# Patient Record
Sex: FEMALE | Race: WHITE | NOT HISPANIC OR LATINO
[De-identification: names, ages, dates, MRNs, and addresses within clinical notes are randomized per-mention and may not be internally consistent; named-entity substitution may affect disease eponyms.]

---

## 2022-09-28 ENCOUNTER — NON-APPOINTMENT (OUTPATIENT)
Age: 42
End: 2022-09-28

## 2022-09-29 ENCOUNTER — APPOINTMENT (OUTPATIENT)
Dept: INTERNAL MEDICINE | Facility: CLINIC | Age: 42
End: 2022-09-29

## 2022-09-29 VITALS
DIASTOLIC BLOOD PRESSURE: 78 MMHG | TEMPERATURE: 97 F | OXYGEN SATURATION: 98 % | RESPIRATION RATE: 16 BRPM | SYSTOLIC BLOOD PRESSURE: 110 MMHG | HEART RATE: 81 BPM

## 2022-09-29 VITALS — BODY MASS INDEX: 24.99 KG/M2 | HEIGHT: 65 IN | WEIGHT: 150 LBS

## 2022-09-29 DIAGNOSIS — R22.31 LOCALIZED SWELLING, MASS AND LUMP, RIGHT UPPER LIMB: ICD-10-CM

## 2022-09-29 PROCEDURE — 99202 OFFICE O/P NEW SF 15 MIN: CPT

## 2022-10-05 NOTE — HISTORY OF PRESENT ILLNESS
[FreeTextEntry1] : Pt c/o right forearm lump about one week. Has mild pain. No redness, pus, trauma. No neuro sx.

## 2022-10-14 ENCOUNTER — NON-APPOINTMENT (OUTPATIENT)
Age: 42
End: 2022-10-14

## 2022-10-14 ENCOUNTER — APPOINTMENT (OUTPATIENT)
Dept: INTERNAL MEDICINE | Facility: CLINIC | Age: 42
End: 2022-10-14

## 2022-10-14 VITALS
HEIGHT: 65 IN | SYSTOLIC BLOOD PRESSURE: 100 MMHG | BODY MASS INDEX: 25.33 KG/M2 | WEIGHT: 152 LBS | DIASTOLIC BLOOD PRESSURE: 80 MMHG | HEART RATE: 87 BPM | OXYGEN SATURATION: 97 %

## 2022-10-14 DIAGNOSIS — Z00.00 ENCOUNTER FOR GENERAL ADULT MEDICAL EXAMINATION W/OUT ABNORMAL FINDINGS: ICD-10-CM

## 2022-10-14 DIAGNOSIS — Z82.49 FAMILY HISTORY OF ISCHEMIC HEART DISEASE AND OTHER DISEASES OF THE CIRCULATORY SYSTEM: ICD-10-CM

## 2022-10-14 DIAGNOSIS — Z78.9 OTHER SPECIFIED HEALTH STATUS: ICD-10-CM

## 2022-10-14 DIAGNOSIS — C43.62 MALIGNANT MELANOMA OF LEFT UPPER LIMB, INCLUDING SHOULDER: ICD-10-CM

## 2022-10-14 PROCEDURE — 36415 COLL VENOUS BLD VENIPUNCTURE: CPT

## 2022-10-14 PROCEDURE — 99396 PREV VISIT EST AGE 40-64: CPT | Mod: 25

## 2022-10-14 NOTE — PHYSICAL EXAM
[No Acute Distress] : no acute distress [No Lymphadenopathy] : no lymphadenopathy [No Edema] : there was no peripheral edema [Normal Appearance] : normal in appearance [No Masses] : no palpable masses [No Nipple Discharge] : no nipple discharge [No Axillary Lymphadenopathy] : no axillary lymphadenopathy [Normal] : soft, non-tender, non-distended, no masses palpated, no HSM and normal bowel sounds

## 2022-10-16 ENCOUNTER — TRANSCRIPTION ENCOUNTER (OUTPATIENT)
Age: 42
End: 2022-10-16

## 2022-10-17 ENCOUNTER — TRANSCRIPTION ENCOUNTER (OUTPATIENT)
Age: 42
End: 2022-10-17

## 2022-10-17 DIAGNOSIS — R20.2 PARESTHESIA OF SKIN: ICD-10-CM

## 2022-10-17 DIAGNOSIS — Z12.39 ENCOUNTER FOR OTHER SCREENING FOR MALIGNANT NEOPLASM OF BREAST: ICD-10-CM

## 2022-10-17 DIAGNOSIS — R92.2 INCONCLUSIVE MAMMOGRAM: ICD-10-CM

## 2022-10-17 DIAGNOSIS — Z78.9 OTHER SPECIFIED HEALTH STATUS: ICD-10-CM

## 2022-10-17 LAB
ALBUMIN SERPL ELPH-MCNC: 4.7 G/DL
ALP BLD-CCNC: 73 U/L
ALT SERPL-CCNC: 12 U/L
ANION GAP SERPL CALC-SCNC: 11 MMOL/L
AST SERPL-CCNC: 11 U/L
BASOPHILS # BLD AUTO: 0.03 K/UL
BASOPHILS NFR BLD AUTO: 0.5 %
BILIRUB SERPL-MCNC: 0.4 MG/DL
BUN SERPL-MCNC: 17 MG/DL
CALCIUM SERPL-MCNC: 9.7 MG/DL
CHLORIDE SERPL-SCNC: 104 MMOL/L
CHOLEST SERPL-MCNC: 184 MG/DL
CO2 SERPL-SCNC: 24 MMOL/L
CREAT SERPL-MCNC: 0.76 MG/DL
EGFR: 102 ML/MIN/1.73M2
EOSINOPHIL # BLD AUTO: 0.15 K/UL
EOSINOPHIL NFR BLD AUTO: 2.4 %
ESTIMATED AVERAGE GLUCOSE: 103 MG/DL
GLUCOSE SERPL-MCNC: 87 MG/DL
HBA1C MFR BLD HPLC: 5.2 %
HCT VFR BLD CALC: 42.5 %
HDLC SERPL-MCNC: 79 MG/DL
HGB BLD-MCNC: 13.8 G/DL
IMM GRANULOCYTES NFR BLD AUTO: 0.2 %
LDLC SERPL CALC-MCNC: 94 MG/DL
LYMPHOCYTES # BLD AUTO: 1.97 K/UL
LYMPHOCYTES NFR BLD AUTO: 31.6 %
MAN DIFF?: NORMAL
MCHC RBC-ENTMCNC: 28.5 PG
MCHC RBC-ENTMCNC: 32.5 GM/DL
MCV RBC AUTO: 87.6 FL
MONOCYTES # BLD AUTO: 0.56 K/UL
MONOCYTES NFR BLD AUTO: 9 %
NEUTROPHILS # BLD AUTO: 3.51 K/UL
NEUTROPHILS NFR BLD AUTO: 56.3 %
NONHDLC SERPL-MCNC: 104 MG/DL
PLATELET # BLD AUTO: 216 K/UL
POTASSIUM SERPL-SCNC: 4.5 MMOL/L
PROT SERPL-MCNC: 7.4 G/DL
RBC # BLD: 4.85 M/UL
RBC # FLD: 13.7 %
SODIUM SERPL-SCNC: 139 MMOL/L
T3FREE SERPL-MCNC: 2.5 PG/ML
T4 FREE SERPL-MCNC: 1.2 NG/DL
TRIGL SERPL-MCNC: 54 MG/DL
TSH SERPL-ACNC: 1.88 UIU/ML
WBC # FLD AUTO: 6.23 K/UL

## 2022-10-17 NOTE — HISTORY OF PRESENT ILLNESS
[FreeTextEntry1] : Here for PE. Last visit was here for lump on right forearm. Says it is much smaller but she will see ortho-oncologist to discuss removal. Had MRI and was told it is benign but to see ortho-onc. \par Says gets episodes of rapid regular palpitations that can last from seconds to several minutes maybe once or twice a month.  Patient says he used to be a lot more frequent before her pregnancy.  Patient does not get associated chest pain, shortness of breath, or dizziness during the palpitations.  She does occasionally feel lightheaded with or without palpitations.  She states she was informed it was because of dehydration.

## 2022-10-17 NOTE — REVIEW OF SYSTEMS
[Palpitations] : palpitations [Fever] : no fever [Night Sweats] : no night sweats [Recent Change In Weight] : ~T no recent weight change [Chest Pain] : no chest pain [Shortness Of Breath] : no shortness of breath [Wheezing] : no wheezing [Cough] : no cough [Dyspnea on Exertion] : no dyspnea on exertion [Abdominal Pain] : no abdominal pain [Nausea] : no nausea [Constipation] : no constipation [Melena] : no melena [Diarrhea] : diarrhea [Dysuria] : no dysuria [Hematuria] : no hematuria

## 2022-10-26 ENCOUNTER — TRANSCRIPTION ENCOUNTER (OUTPATIENT)
Age: 42
End: 2022-10-26

## 2022-10-26 DIAGNOSIS — R20.0 ANESTHESIA OF SKIN: ICD-10-CM

## 2022-10-27 DIAGNOSIS — Z82.49 FAMILY HISTORY OF ISCHEMIC HEART DISEASE AND OTHER DISEASES OF THE CIRCULATORY SYSTEM: ICD-10-CM

## 2022-11-02 ENCOUNTER — APPOINTMENT (OUTPATIENT)
Dept: INTERNAL MEDICINE | Facility: CLINIC | Age: 42
End: 2022-11-02

## 2022-11-02 PROCEDURE — 93242 EXT ECG>48HR<7D RECORDING: CPT

## 2022-11-11 PROBLEM — R42 DIZZINESS: Status: RESOLVED | Noted: 2022-10-27 | Resolved: 2022-11-26

## 2022-11-15 ENCOUNTER — APPOINTMENT (OUTPATIENT)
Dept: CARDIOLOGY | Facility: CLINIC | Age: 42
End: 2022-11-15

## 2022-11-15 VITALS
HEART RATE: 82 BPM | BODY MASS INDEX: 25.33 KG/M2 | OXYGEN SATURATION: 100 % | DIASTOLIC BLOOD PRESSURE: 70 MMHG | WEIGHT: 152 LBS | TEMPERATURE: 97.6 F | HEIGHT: 65 IN | SYSTOLIC BLOOD PRESSURE: 100 MMHG

## 2022-11-15 DIAGNOSIS — R00.2 PALPITATIONS: ICD-10-CM

## 2022-11-15 DIAGNOSIS — R42 DIZZINESS AND GIDDINESS: ICD-10-CM

## 2022-11-15 DIAGNOSIS — R20.0 ANESTHESIA OF SKIN: ICD-10-CM

## 2022-11-15 PROCEDURE — 99204 OFFICE O/P NEW MOD 45 MIN: CPT

## 2022-11-23 ENCOUNTER — APPOINTMENT (OUTPATIENT)
Dept: INTERNAL MEDICINE | Facility: CLINIC | Age: 42
End: 2022-11-23

## 2022-11-23 VITALS
RESPIRATION RATE: 16 BRPM | WEIGHT: 152 LBS | TEMPERATURE: 98 F | HEIGHT: 65 IN | SYSTOLIC BLOOD PRESSURE: 110 MMHG | DIASTOLIC BLOOD PRESSURE: 70 MMHG | BODY MASS INDEX: 25.33 KG/M2 | OXYGEN SATURATION: 97 % | HEART RATE: 96 BPM

## 2022-11-23 DIAGNOSIS — L98.9 DISORDER OF THE SKIN AND SUBCUTANEOUS TISSUE, UNSPECIFIED: ICD-10-CM

## 2022-11-23 DIAGNOSIS — R47.81 SLURRED SPEECH: ICD-10-CM

## 2022-11-23 PROCEDURE — 99212 OFFICE O/P EST SF 10 MIN: CPT

## 2022-11-23 NOTE — HISTORY OF PRESENT ILLNESS
[FreeTextEntry8] : Patient states yesterday she noted a rash on the back of her left arm near the shoulder.  No pain, itch, pus.  Does not recall any trauma to the area.  No similar rash anywhere else

## 2022-11-23 NOTE — PHYSICAL EXAM
[No Acute Distress] : no acute distress [Normal Voice/Communication] : normal voice/communication [de-identified] : Approximately 5 mm skin ulceration posterior left arm with no erythema, exudate, scaling.  Early granulation tissue.

## 2024-06-21 ENCOUNTER — NON-APPOINTMENT (OUTPATIENT)
Age: 44
End: 2024-06-21

## 2024-06-22 ENCOUNTER — LABORATORY RESULT (OUTPATIENT)
Age: 44
End: 2024-06-22

## 2024-06-22 ENCOUNTER — NON-APPOINTMENT (OUTPATIENT)
Age: 44
End: 2024-06-22

## 2024-06-22 ENCOUNTER — APPOINTMENT (OUTPATIENT)
Dept: INTERNAL MEDICINE | Facility: CLINIC | Age: 44
End: 2024-06-22
Payer: COMMERCIAL

## 2024-06-22 VITALS — HEART RATE: 94 BPM | OXYGEN SATURATION: 98 % | BODY MASS INDEX: 23.99 KG/M2 | WEIGHT: 144 LBS | HEIGHT: 65 IN

## 2024-06-22 VITALS — DIASTOLIC BLOOD PRESSURE: 78 MMHG | SYSTOLIC BLOOD PRESSURE: 118 MMHG

## 2024-06-22 DIAGNOSIS — Z83.49 FAMILY HISTORY OF OTHER ENDOCRINE, NUTRITIONAL AND METABOLIC DISEASES: ICD-10-CM

## 2024-06-22 DIAGNOSIS — R53.83 OTHER FATIGUE: ICD-10-CM

## 2024-06-22 DIAGNOSIS — G43.109 MIGRAINE WITH AURA, NOT INTRACTABLE, W/OUT STATUS MIGRAINOSUS: ICD-10-CM

## 2024-06-22 DIAGNOSIS — M79.10 MYALGIA, UNSPECIFIED SITE: ICD-10-CM

## 2024-06-22 DIAGNOSIS — Z00.00 ENCOUNTER FOR GENERAL ADULT MEDICAL EXAMINATION W/OUT ABNORMAL FINDINGS: ICD-10-CM

## 2024-06-22 DIAGNOSIS — E03.8 OTHER SPECIFIED HYPOTHYROIDISM: ICD-10-CM

## 2024-06-22 DIAGNOSIS — E06.3 OTHER SPECIFIED HYPOTHYROIDISM: ICD-10-CM

## 2024-06-22 DIAGNOSIS — Z82.0 FAMILY HISTORY OF EPILEPSY AND OTHER DISEASES OF THE NERVOUS SYSTEM: ICD-10-CM

## 2024-06-22 PROCEDURE — 99396 PREV VISIT EST AGE 40-64: CPT | Mod: 25

## 2024-06-22 PROCEDURE — G2211 COMPLEX E/M VISIT ADD ON: CPT | Mod: NC

## 2024-06-22 PROCEDURE — 93000 ELECTROCARDIOGRAM COMPLETE: CPT

## 2024-06-22 PROCEDURE — 36415 COLL VENOUS BLD VENIPUNCTURE: CPT

## 2024-06-22 RX ORDER — RIZATRIPTAN BENZOATE 5 MG/1
5 TABLET ORAL
Qty: 90 | Refills: 0 | Status: ACTIVE | COMMUNITY
Start: 2024-06-22 | End: 1900-01-01

## 2024-06-22 NOTE — ASSESSMENT
[FreeTextEntry1] : check cbc with diff, cmp, tsh w/ fT4, lipid, a1c, UA  check tick panel, EMA, RF, anti-CCP, CK, aldolase no need for doxy, no rash EKG obtained to assist in diagnosis and management of assessed problem(s). NSR, rate 77, no ST changes but PRWP and diffuse low voltage- unchanged from previous EKG's. No chest pain, shortness of breath, dizziness, palpitations.  Renewed Rizatriptan. Has enough Synthroid. Needs to send records from endocrine (Dr. Swift) and GYN (Dr. Ibanez).

## 2024-06-22 NOTE — HEALTH RISK ASSESSMENT
[YES] : Yes [Are there any children in your household?] : There are children in the household. [Have you attended a firearm safety workshop or class?] : A firearm safety workshop or class has been attended. [Yes] : Yes [2 - 4 times a month (2 pts)] : 2-4 times a month (2 points) [1 or 2 (0 pts)] : 1 or 2 (0 points) [Never (0 pts)] : Never (0 points) [No] : In the past 12 months have you used drugs other than those required for medical reasons? No [Little interest or pleasure doing things] : 1) Little interest or pleasure doing things [Feeling down, depressed, or hopeless] : 2) Feeling down, depressed, or hopeless [0] : 2) Feeling down, depressed, or hopeless: Not at all (0) [PHQ-2 Negative - No further assessment needed] : PHQ-2 Negative - No further assessment needed [Audit-CScore] : 2 [de-identified] : walking [de-identified] : fair [[id number]] : 0 [Patient reported mammogram was abnormal] : Patient reported mammogram was abnormal [Patient reported PAP Smear was normal] : Patient reported PAP Smear was normal [HIV test declined] : HIV test declined [Hepatitis C test declined] : Hepatitis C test declined [With Family] : lives with family [Employed] : employed [Graduate School] : graduate school [] :  [# Of Children ___] : has [unfilled] children [Sexually Active] : sexually active [High Risk Behavior] : no high risk behavior [Feels Safe at Home] : Feels safe at home [Fully functional (bathing, dressing, toileting, transferring, walking, feeding)] : Fully functional (bathing, dressing, toileting, transferring, walking, feeding) [Fully functional (using the telephone, shopping, preparing meals, housekeeping, doing laundry, using] : Fully functional and needs no help or supervision to perform IADLs (using the telephone, shopping, preparing meals, housekeeping, doing laundry, using transportation, managing medications and managing finances) [Reports changes in hearing] : Reports no changes in hearing [Reports changes in vision] : Reports no changes in vision [Reports normal functional visual acuity (ie: able to read med bottle)] : Reports normal functional visual acuity [Reports changes in dental health] : Reports no changes in dental health [MammogramComments] : need records  [MammogramDate] : 08/23 [PapSmearDate] : 01/24 [PapSmearComments] : need records  [Never] : Never [Are there any unlocked firearms stored in your household?] : No unlocked firearms in the household. [Are there any firearms stored in your household that are loaded?] : No firearms are stored in the household loaded. [Has anyone in the household been feeling low/depressed/been struggling?] : No one in the household has been feeling low/depressed/been struggling.

## 2024-06-22 NOTE — REVIEW OF SYSTEMS
[Fatigue] : fatigue [Joint Stiffness] : joint stiffness [Muscle Weakness] : muscle weakness [Muscle Pain] : muscle pain [Negative] : Heme/Lymph

## 2024-06-22 NOTE — PHYSICAL EXAM
[No Acute Distress] : no acute distress [Well Nourished] : well nourished [Well Developed] : well developed [Well-Appearing] : well-appearing [Normal Sclera/Conjunctiva] : normal sclera/conjunctiva [PERRL] : pupils equal round and reactive to light [EOMI] : extraocular movements intact [Normal Outer Ear/Nose] : the outer ears and nose were normal in appearance [Normal Oropharynx] : the oropharynx was normal [No JVD] : no jugular venous distention [No Lymphadenopathy] : no lymphadenopathy [Thyroid Normal, No Nodules] : the thyroid was normal and there were no nodules present [Supple] : supple [No Accessory Muscle Use] : no accessory muscle use [No Respiratory Distress] : no respiratory distress  [Clear to Auscultation] : lungs were clear to auscultation bilaterally [Normal Rate] : normal rate  [Regular Rhythm] : with a regular rhythm [Normal S1, S2] : normal S1 and S2 [No Murmur] : no murmur heard [No Carotid Bruits] : no carotid bruits [No Abdominal Bruit] : a ~M bruit was not heard ~T in the abdomen [No Varicosities] : no varicosities [Pedal Pulses Present] : the pedal pulses are present [No Edema] : there was no peripheral edema [No Palpable Aorta] : no palpable aorta [No Extremity Clubbing/Cyanosis] : no extremity clubbing/cyanosis [Soft] : abdomen soft [Non Tender] : non-tender [Non-distended] : non-distended [No Masses] : no abdominal mass palpated [No HSM] : no HSM [Normal Bowel Sounds] : normal bowel sounds [Normal Posterior Cervical Nodes] : no posterior cervical lymphadenopathy [Normal Anterior Cervical Nodes] : no anterior cervical lymphadenopathy [No CVA Tenderness] : no CVA  tenderness [No Spinal Tenderness] : no spinal tenderness [No Joint Swelling] : no joint swelling [Grossly Normal Strength/Tone] : grossly normal strength/tone [No Rash] : no rash [Coordination Grossly Intact] : coordination grossly intact [No Focal Deficits] : no focal deficits [Normal Gait] : normal gait [Deep Tendon Reflexes (DTR)] : deep tendon reflexes were 2+ and symmetric [Normal Affect] : the affect was normal [Normal Insight/Judgement] : insight and judgment were intact [de-identified] : bicep, tricep, b/l calf pain when palpated, negative Basil's sign, no palpable cord

## 2024-06-24 LAB
ALBUMIN SERPL ELPH-MCNC: 4.4 G/DL
ALP BLD-CCNC: 39 U/L
ALT SERPL-CCNC: 13 U/L
ANION GAP SERPL CALC-SCNC: 10 MMOL/L
AST SERPL-CCNC: 19 U/L
BILIRUB SERPL-MCNC: 0.4 MG/DL
BUN SERPL-MCNC: 14 MG/DL
CALCIUM SERPL-MCNC: 8.9 MG/DL
CHLORIDE SERPL-SCNC: 108 MMOL/L
CHOLEST SERPL-MCNC: 139 MG/DL
CK SERPL-CCNC: 269 U/L
CO2 SERPL-SCNC: 21 MMOL/L
CREAT SERPL-MCNC: 0.67 MG/DL
EGFR: 112 ML/MIN/1.73M2
ESTIMATED AVERAGE GLUCOSE: 103 MG/DL
GLUCOSE SERPL-MCNC: 89 MG/DL
HBA1C MFR BLD HPLC: 5.2 %
HDLC SERPL-MCNC: 67 MG/DL
LDLC SERPL CALC-MCNC: 62 MG/DL
NONHDLC SERPL-MCNC: 72 MG/DL
POTASSIUM SERPL-SCNC: 4.3 MMOL/L
PROT SERPL-MCNC: 6.9 G/DL
RHEUMATOID FACT SER QL: <10 IU/ML
SODIUM SERPL-SCNC: 140 MMOL/L
T4 FREE SERPL-MCNC: 1.2 NG/DL
TRIGL SERPL-MCNC: 40 MG/DL
TSH SERPL-ACNC: 2.73 UIU/ML

## 2024-06-24 RX ORDER — LEVOTHYROXINE SODIUM 50 UG/1
50 CAPSULE ORAL
Refills: 0 | Status: DISCONTINUED | COMMUNITY
Start: 2022-10-14 | End: 2024-06-24

## 2024-06-24 RX ORDER — LEVOTHYROXINE SODIUM 0.05 MG/1
50 TABLET ORAL
Qty: 90 | Refills: 0 | Status: ACTIVE | COMMUNITY
Start: 2024-03-20

## 2024-06-26 LAB
ALDOLASE SERPL-CCNC: 3.3 U/L
BASOPHILS # BLD AUTO: 0.02 K/UL
BASOPHILS NFR BLD AUTO: 0.5 %
CCP AB SER IA-ACNC: <8 UNITS
EOSINOPHIL # BLD AUTO: 0.03 K/UL
EOSINOPHIL NFR BLD AUTO: 0.8 %
ERYTHROCYTE [SEDIMENTATION RATE] IN BLOOD BY WESTERGREN METHOD: 2 MM/HR
HCT VFR BLD CALC: 49.5 %
HGB BLD-MCNC: 13.7 G/DL
IMM GRANULOCYTES NFR BLD AUTO: 0 %
LYMPHOCYTES # BLD AUTO: 1.45 K/UL
LYMPHOCYTES NFR BLD AUTO: 37.5 %
MAN DIFF?: NORMAL
MCHC RBC-ENTMCNC: 27.7 GM/DL
MCHC RBC-ENTMCNC: 27.9 PG
MCV RBC AUTO: 100.8 FL
MONOCYTES # BLD AUTO: 0.24 K/UL
MONOCYTES NFR BLD AUTO: 6.2 %
NEUTROPHILS # BLD AUTO: 2.13 K/UL
NEUTROPHILS NFR BLD AUTO: 55 %
PLATELET # BLD AUTO: 174 K/UL
RBC # BLD: 4.91 M/UL
RBC # FLD: 15.1 %
RF+CCP IGG SER-IMP: NEGATIVE
WBC # FLD AUTO: 3.87 K/UL

## 2024-06-29 DIAGNOSIS — R76.8 OTHER SPECIFIED ABNORMAL IMMUNOLOGICAL FINDINGS IN SERUM: ICD-10-CM

## 2024-06-29 LAB
A PHAGOCYTOPH IGG TITR SER IF: NORMAL
ANA PAT FLD IF-IMP: NORMAL
ANA SER IF-ACNC: ABNORMAL
B BURGDOR AB SER QL IA: 0.24 IV
B MICROTI IGG TITR SER: NORMAL
E CHAFFEENSIS IGG TITR SER IF: NORMAL

## 2024-07-03 ENCOUNTER — APPOINTMENT (OUTPATIENT)
Dept: INTERNAL MEDICINE | Facility: CLINIC | Age: 44
End: 2024-07-03
Payer: COMMERCIAL

## 2024-07-03 VITALS — WEIGHT: 144 LBS | BODY MASS INDEX: 23.99 KG/M2 | OXYGEN SATURATION: 98 % | HEIGHT: 65 IN | HEART RATE: 79 BPM

## 2024-07-03 VITALS — DIASTOLIC BLOOD PRESSURE: 68 MMHG | SYSTOLIC BLOOD PRESSURE: 110 MMHG

## 2024-07-03 DIAGNOSIS — R76.8 OTHER SPECIFIED ABNORMAL IMMUNOLOGICAL FINDINGS IN SERUM: ICD-10-CM

## 2024-07-03 DIAGNOSIS — R74.8 ABNORMAL LEVELS OF OTHER SERUM ENZYMES: ICD-10-CM

## 2024-07-03 DIAGNOSIS — E03.8 OTHER SPECIFIED HYPOTHYROIDISM: ICD-10-CM

## 2024-07-03 DIAGNOSIS — E06.3 OTHER SPECIFIED HYPOTHYROIDISM: ICD-10-CM

## 2024-07-03 DIAGNOSIS — M79.10 MYALGIA, UNSPECIFIED SITE: ICD-10-CM

## 2024-07-03 DIAGNOSIS — R53.83 OTHER FATIGUE: ICD-10-CM

## 2024-07-03 DIAGNOSIS — G43.109 MIGRAINE WITH AURA, NOT INTRACTABLE, W/OUT STATUS MIGRAINOSUS: ICD-10-CM

## 2024-07-03 PROCEDURE — G2211 COMPLEX E/M VISIT ADD ON: CPT | Mod: NC

## 2024-07-03 PROCEDURE — 36415 COLL VENOUS BLD VENIPUNCTURE: CPT

## 2024-07-03 PROCEDURE — 99213 OFFICE O/P EST LOW 20 MIN: CPT

## 2024-07-06 LAB
ALBUMIN SERPL ELPH-MCNC: 4.4 G/DL
ALP BLD-CCNC: 43 U/L
ALT SERPL-CCNC: 10 U/L
ANION GAP SERPL CALC-SCNC: 16 MMOL/L
APPEARANCE: CLEAR
AST SERPL-CCNC: 12 U/L
BACTERIA: NEGATIVE /HPF
BASOPHILS # BLD AUTO: 0.02 K/UL
BASOPHILS NFR BLD AUTO: 0.4 %
BILIRUB SERPL-MCNC: 0.6 MG/DL
BILIRUBIN URINE: NEGATIVE
BLOOD URINE: NEGATIVE
BUN SERPL-MCNC: 10 MG/DL
CALCIUM SERPL-MCNC: 9.1 MG/DL
CAST: 0 /LPF
CHLORIDE SERPL-SCNC: 102 MMOL/L
CK SERPL-CCNC: 60 U/L
CO2 SERPL-SCNC: 18 MMOL/L
COLOR: YELLOW
CREAT SERPL-MCNC: 0.71 MG/DL
EGFR: 109 ML/MIN/1.73M2
EOSINOPHIL # BLD AUTO: 0.07 K/UL
EOSINOPHIL NFR BLD AUTO: 1.4 %
EPITHELIAL CELLS: 2 /HPF
GLUCOSE QUALITATIVE U: NEGATIVE MG/DL
GLUCOSE SERPL-MCNC: 91 MG/DL
HCT VFR BLD CALC: 42.7 %
HGB BLD-MCNC: 13.7 G/DL
IMM GRANULOCYTES NFR BLD AUTO: 0.2 %
KETONES URINE: NEGATIVE MG/DL
LDH SERPL-CCNC: 174 U/L
LEUKOCYTE ESTERASE URINE: NEGATIVE
LYMPHOCYTES # BLD AUTO: 1.57 K/UL
LYMPHOCYTES NFR BLD AUTO: 31.4 %
MAN DIFF?: NORMAL
MCHC RBC-ENTMCNC: 28.8 PG
MCHC RBC-ENTMCNC: 32.1 GM/DL
MCV RBC AUTO: 89.9 FL
MICROSCOPIC-UA: NORMAL
MONOCYTES # BLD AUTO: 0.43 K/UL
MONOCYTES NFR BLD AUTO: 8.6 %
NEUTROPHILS # BLD AUTO: 2.9 K/UL
NEUTROPHILS NFR BLD AUTO: 58 %
NITRITE URINE: NEGATIVE
PH URINE: 6.5
PLATELET # BLD AUTO: 215 K/UL
POTASSIUM SERPL-SCNC: 4.3 MMOL/L
PROT SERPL-MCNC: 7 G/DL
PROTEIN URINE: NEGATIVE MG/DL
RBC # BLD: 4.75 M/UL
RBC # FLD: 14 %
RED BLOOD CELLS URINE: 1 /HPF
SODIUM SERPL-SCNC: 137 MMOL/L
SPECIFIC GRAVITY URINE: 1.01
UROBILINOGEN URINE: 0.2 MG/DL
WBC # FLD AUTO: 5 K/UL
WHITE BLOOD CELLS URINE: 0 /HPF

## 2024-08-27 ENCOUNTER — APPOINTMENT (OUTPATIENT)
Dept: RHEUMATOLOGY | Facility: CLINIC | Age: 44
End: 2024-08-27
Payer: COMMERCIAL

## 2024-08-27 VITALS
WEIGHT: 145 LBS | DIASTOLIC BLOOD PRESSURE: 74 MMHG | BODY MASS INDEX: 24.16 KG/M2 | HEART RATE: 78 BPM | HEIGHT: 65 IN | SYSTOLIC BLOOD PRESSURE: 112 MMHG | OXYGEN SATURATION: 99 % | TEMPERATURE: 97 F

## 2024-08-27 DIAGNOSIS — R53.83 OTHER FATIGUE: ICD-10-CM

## 2024-08-27 DIAGNOSIS — R74.8 ABNORMAL LEVELS OF OTHER SERUM ENZYMES: ICD-10-CM

## 2024-08-27 DIAGNOSIS — R23.3 SPONTANEOUS ECCHYMOSES: ICD-10-CM

## 2024-08-27 DIAGNOSIS — E06.3 AUTOIMMUNE THYROIDITIS: ICD-10-CM

## 2024-08-27 DIAGNOSIS — G43.109 MIGRAINE WITH AURA, NOT INTRACTABLE, W/OUT STATUS MIGRAINOSUS: ICD-10-CM

## 2024-08-27 DIAGNOSIS — F45.8 OTHER SOMATOFORM DISORDERS: ICD-10-CM

## 2024-08-27 DIAGNOSIS — R59.1 GENERALIZED ENLARGED LYMPH NODES: ICD-10-CM

## 2024-08-27 DIAGNOSIS — Z82.61 FAMILY HISTORY OF ARTHRITIS: ICD-10-CM

## 2024-08-27 DIAGNOSIS — M79.10 MYALGIA, UNSPECIFIED SITE: ICD-10-CM

## 2024-08-27 DIAGNOSIS — Z83.49 FAMILY HISTORY OF OTHER ENDOCRINE, NUTRITIONAL AND METABOLIC DISEASES: ICD-10-CM

## 2024-08-27 DIAGNOSIS — R76.8 OTHER SPECIFIED ABNORMAL IMMUNOLOGICAL FINDINGS IN SERUM: ICD-10-CM

## 2024-08-27 DIAGNOSIS — G47.63 SLEEP RELATED BRUXISM: ICD-10-CM

## 2024-08-27 PROCEDURE — 99203 OFFICE O/P NEW LOW 30 MIN: CPT

## 2024-09-02 PROBLEM — F45.8 BRUXISM (TEETH GRINDING): Status: ACTIVE | Noted: 2024-09-02

## 2024-09-02 PROBLEM — G47.63 BRUXISM, SLEEP-RELATED: Status: ACTIVE | Noted: 2024-09-02

## 2024-09-02 PROBLEM — E06.3 HASHIMOTO'S DISEASE: Status: ACTIVE | Noted: 2024-09-02

## 2024-09-02 PROBLEM — R59.1 LYMPHADENOPATHY: Status: ACTIVE | Noted: 2024-09-02

## 2024-09-02 PROBLEM — E06.3 HYPOTHYROIDISM DUE TO HASHIMOTO'S THYROIDITIS: Status: ACTIVE | Noted: 2022-09-29

## 2024-09-02 RX ORDER — ACETAMINOPHEN 325 MG
TABLET ORAL
Refills: 0 | Status: ACTIVE | COMMUNITY

## 2024-09-02 NOTE — HISTORY OF PRESENT ILLNESS
[FreeTextEntry1] : [birth year] - 45yo female with chief complaint of positive EMA and fatigue  Initial HPI with c/o fatigue and mild diffuse arthralgias w/ + EMA [id number] and mildly elevated -> repeat nl   Past muscle aches and pain, resolved Fatigue is primary current issue, symptoms are new and persistent past few months  Patient works full-time as a special-ed  Used to work out at the gym, but hasn't much since having son.. 2 ys ago..   Hx two TIAs postpartum two years ago with no residual sx and etiology unclear.. full eval at time negative for AI process.   Presented with left side numb and droopy - weakness for several minutes.  Week or two later had same symptoms again, complains on intense headache afterward Visited neurologists, attributed to complex migraine Hx chronic migraines - was on Aspirin during pregnancy to treat migraines with hx of experiencing scotomas in March  Was not tested for antiphospholipid syndrome s/p IVF, but no miscarriages Past infertility caused by unexplained reasons  No DVT, no heart attack or stroke, no phlebitis Occasional tingling in hand and elbow on right side since but nothing persis Has herniated disk (5 & 6)  No cytopenias Has Hashimoto's (normal thyroid function) and RP- mild x several ys, no medications needed (doesn't take anything for it) No ulcers that don't heal Complains of orthostatic hypertension, frequent lightheadedness but no syncope  In June, fatigue pains and tiredness occurred around limbs (at this time CPK was 277)- can't remember acute infection / stress/ injury preceding onset   Sleep: NRS  Fatigue several months now..  waking up several times a night, can be up for an hour before returning to sleep, attributes to anxiety- can't stop mind  No past anxiety diagnosis, thinks she might have it Does not wake up feeling restored No issues with snoring,  +night bruxism, occasional hand numbness at night but this is not what wakes her Occasional night sweats (not hot flashes)- but nothing routinely.  Latency minimal..   Posterior cervical lymph node enlarged, still enlarged  Biopsied at least a year ago, negative  No other swollen lymph nodes  Occasionally feels some anxiety-attributed chest tightness/shortness of breath Some numbness/tingling in right arm Bilateral hand numbness at night  Some joint pain but no inflammation  Specifically denies mucositis, visual disturbance/ keratoconjunctivitis/ oral / vaginal dryness, serositis (no cardiopulmonary, HSM), no renal dysfunction, rash, alopecia, cytopenias, bleeding.  As noted TIAs x 2 = and occas paresthesia's BUE but not associated w/ weakness

## 2024-09-02 NOTE — PHYSICAL EXAM
[General Appearance - Alert] : alert [General Appearance - In No Acute Distress] : in no acute distress [Sclera] : the sclera and conjunctiva were normal [PERRL With Normal Accommodation] : pupils were equal in size, round, and reactive to light [Extraocular Movements] : extraocular movements were intact [Outer Ear] : the ears and nose were normal in appearance [Examination Of The Oral Cavity] : the lips and gums were normal [Nasal Cavity] : the nasal mucosa and septum were normal [Oropharynx] : the oropharynx was normal [Neck Appearance] : the appearance of the neck was normal [Jugular Venous Distention Increased] : there was no jugular-venous distention [Thyroid Diffuse Enlargement] : the thyroid was not enlarged [Thyroid Nodule] : there were no palpable thyroid nodules [Respiration, Rhythm And Depth] : normal respiratory rhythm and effort [Auscultation Breath Sounds / Voice Sounds] : lungs were clear to auscultation bilaterally [Heart Rate And Rhythm] : heart rate was normal and rhythm regular [Heart Sounds] : normal S1 and S2 [Heart Sounds Gallop] : no gallops [Murmurs] : no murmurs [Heart Sounds Pericardial Friction Rub] : no pericardial rub [Full Pulse] : the pedal pulses are present [Edema] : there was no peripheral edema [Bowel Sounds] : normal bowel sounds [Abdomen Soft] : soft [Abdomen Tenderness] : non-tender [Abdomen Mass (___ Cm)] : no abdominal mass palpated [Cervical Lymph Nodes Enlarged Posterior Bilaterally] : posterior cervical [Supraclavicular Lymph Nodes Enlarged Bilaterally] : supraclavicular [Axillary Lymph Nodes Enlarged Bilaterally] : axillary [Femoral Lymph Nodes Enlarged Bilaterally] : femoral [Inguinal Lymph Nodes Enlarged Bilaterally] : inguinal [No CVA Tenderness] : no ~M costovertebral angle tenderness [No Spinal Tenderness] : no spinal tenderness [Abnormal Walk] : normal gait [Nail Clubbing] : no clubbing  or cyanosis of the fingernails [Musculoskeletal - Swelling] : no joint swelling seen [Motor Tone] : muscle strength and tone were normal [Skin Color & Pigmentation] : normal skin color and pigmentation [] : no rash [Skin Turgor] : normal skin turgor [Sensation] : the sensory exam was normal to light touch and pinprick [No Focal Deficits] : no focal deficits [Oriented To Time, Place, And Person] : oriented to person, place, and time [Affect] : the affect was normal [Impaired Insight] : insight and judgment were intact [FreeTextEntry1] : calm, pleasant, describes anxiety

## 2024-09-02 NOTE — ASSESSMENT
[FreeTextEntry1] : 43yo w female with PMH AITD, mild orthostatic hypotension, RUE mild intermittent paresthesias, RP, and presents with chief complaint of positive EMA and fatigue worse since 6/24- but actually improved since then  1) + EMA/ AITD:  1:160 w/ DFSO pattern- not routinely associated w/ CTD.  Dx w/ Hashimotos ys ago on thyroid replacment with nl TFTs.  Longstanding hx mild RP - without complications and no medication needed  AITD Certainly could explain + EMA. Little in hx or exam to suggest CTD at this time.  NOTE:  - Hx TIA:  2022, x 2 episodes w/u negative at time for APS- no hx MI/ DVT/ PE or cytopenias  - Infertility:  etiology unclear, required aSA for successful pregnancy   2) Fatigue r/t Insomnia and probable SAVI: Describes NRS with  mild persistent with hx of anxiety by description interfering with sleep and known bruxism- not using mouth guard- needs updated device.   Wakes in middle of night and can't go back to sleep... can't stop "mind".   Denies snoring, no trouble falling asleep most nights, occas night sweats but nothing routine  - Migraines:  long standing.. with possible complex hx TIA x 2 w/ APS studies in past neg and opth manifestations- scotomas in past   - Orthostatic Hypotension:  mild intermittent. not associated with tachycardia, no syncope/  injuries- c/w volume/ electrolyte replacement..  - Anxiety:  may need pharmacotherapy.. if persists - Exercise: needs to resume and ideally 30 min outdoors daily- known to improve quality of sleep  3) Cervical radiculopathy:  Mild intermittent R hand/ elbow paresthesias- tingling / numbness..  MR + HNP C 5-6 (reportedly- would need to review)  4) Posterior cervical lymph node enlarged, still enlarged R side..  Biopsied at least a year ago, negative  No other swollen lymph nodes  Discussion:  Overall, healthy young f with little to suggest a systemic process.  There are several issues that could be addressed to manage fatigue- improve function..  - manage fatigue as needed through  1.  Managing anxiety 2.  Should address bruxism, needs to wear mouth guard, degree of pressure can be severe and result in NRS 3.  Exercise ideally and if needed may consider LD SSRI 4.  Manage stress:  consider therapy as needed if unable to manage on own... discussed use of online tools especially when waking in middle of night: Calm ruy/ Insight Timer.. can be used to promote return to sleep 5.  Mnt healthy sleep hygiene:  room dark, cool, no pets/ children in bed.  Bed is for sleep/ sex- nothing more - monitor for SS SLE/ APS but little on exam today and reassurance provided of such - monitor LN and address as needed if increase in lymphadenopathy.     Plan 8/27/24: - Manage anxiety, see therapist (probably no meds unless worsens) see above  - Try  to improve sleep, get updated oral appliance and be c/w use...though addressing anxiety may help bruxism  - given TIA  and Lymphadenopathy .. in setting of AITD hx will do full AVISE but again reassurance provided that little to suggest.    - RPA as needed, should be able to manage in Primary care, return if AI sx evolve.

## 2024-09-02 NOTE — CONSULT LETTER
[Dear  ___] : Dear  [unfilled], [Consult Letter:] : I had the pleasure of evaluating your patient, [unfilled]. [Consult Closing:] : Thank you very much for allowing me to participate in the care of this patient.  If you have any questions, please do not hesitate to contact me. [Sincerely,] : Sincerely, [FreeTextEntry2] : Delmer Rose MD  [FreeTextEntry1] : Please see below for summary of  recent rheumatology evaluation and recommendations.  45yo w female with PMH AITD, mild orthostatic hypotension, RUE mild intermittent paresthesias, RP, and presents with chief complaint of positive EMA and fatigue worse since 6/24- but actually improved since then  1) + EMA/ AITD:  1:160 w/ DFSO pattern- not routinely associated w/ CTD.  Dx w/ Hashimotos ys ago on thyroid replacment with nl TFTs.  Longstanding hx mild RP - without complications and no medication needed  AITD Certainly could explain + EMA. Little in hx or exam to suggest CTD at this time.  NOTE:  - Hx TIA:  2022, x 2 episodes w/u negative at time for APS- no hx MI/ DVT/ PE or cytopenias  - Infertility:  etiology unclear, required aSA for successful pregnancy   2) Fatigue r/t Insomnia and probable SAVI: Describes NRS with  mild persistent with hx of anxiety by description interfering with sleep and known bruxism- not using mouth guard- needs updated device.   Wakes in middle of night and can't go back to sleep... can't stop "mind".   Denies snoring, no trouble falling asleep most nights, occas night sweats but nothing routine  - Migraines:  long standing.. with possible complex hx TIA x 2 w/ APS studies in past neg and opth manifestations- scotomas in past   - Orthostatic Hypotension:  mild intermittent. not associated with tachycardia, no syncope/  injuries- c/w volume/ electrolyte replacement..  - Anxiety:  may need pharmacotherapy.. if persists - Exercise: needs to resume and ideally 30 min outdoors daily- known to improve quality of sleep  3) Cervical radiculopathy:  Mild intermittent R hand/ elbow paresthesias- tingling / numbness..  MR + HNP C 5-6 (reportedly- would need to review)  4) Posterior cervical lymph node enlarged, still enlarged R side..  Biopsied at least a year ago, negative  No other swollen lymph nodes  Discussion:  Overall, healthy young f with little to suggest a systemic process.  There are several issues that could be addressed to manage fatigue- improve function..  - manage fatigue as needed through  1.  Managing anxiety 2.  Should address bruxism, needs to wear mouth guard, degree of pressure can be severe and result in NRS 3.  Exercise ideally and if needed may consider LD SSRI 4.  Manage stress:  consider therapy as needed if unable to manage on own... discussed use of online tools especially when waking in middle of night: Calm ruy/ Insight Timer.. can be used to promote return to sleep 5.  Mnt healthy sleep hygiene:  room dark, cool, no pets/ children in bed.  Bed is for sleep/ sex- nothing more - monitor for SS SLE/ APS but little on exam today and reassurance provided of such - monitor LN and address as needed if increase in lymphadenopathy.     Plan 8/27/24: - Manage anxiety, see therapist (probably no meds unless worsens) see above  - Try  to improve sleep, get updated oral appliance and be c/w use...though addressing anxiety may help bruxism  - given TIA  and Lymphadenopathy .. in setting of AITD hx will do full AVISE but again reassurance provided that little to suggest.    - RPA as needed, should be able to manage in Primary care, return if AI sx evolve.   [FreeTextEntry3] : Ramonita Walker DNP, [facility] Rheumatology [facility]

## 2024-09-02 NOTE — REVIEW OF SYSTEMS
[Feeling Tired] : feeling tired [Eyesight Problems] : eyesight problems [Shortness Of Breath] : shortness of breath [Joint Pain] : joint pain [Limb Pain] : limb pain [Dizziness] : dizziness [Feelings Of Weakness] : feelings of weakness [Anxiety] : anxiety [Easy Bruising] : a tendency for easy bruising [Swollen Glands In The Neck] : swollen glands in the neck [Palpitations] : palpitations [Sleep Disturbances] : sleep disturbances [As Noted in HPI] : as noted in HPI [Negative] : Heme/Lymph [FreeTextEntry2] : Interrupted sleep [FreeTextEntry3] : Scotomas in March, resolved now [FreeTextEntry5] : anxiety r/t  [FreeTextEntry6] : Occasional anxiety-induced shortness of breath [FreeTextEntry8] : G 1 P1  [FreeTextEntry9] : Some pain and numbness and tingling in right arm- mild joint pain symmetrically but no joint stiffness/ swellling  [de-identified] : Orthostatic hypertension- not new- better with electrolyte replacement  [de-identified] : Clear anxiety symptoms [de-identified] : Fatigue symptoms [de-identified] : Swollen back cervical lymph node, mentions easy bruising

## 2024-09-02 NOTE — REVIEW OF SYSTEMS
[Feeling Tired] : feeling tired [Eyesight Problems] : eyesight problems [Shortness Of Breath] : shortness of breath [Joint Pain] : joint pain [Limb Pain] : limb pain [Dizziness] : dizziness [Feelings Of Weakness] : feelings of weakness [Anxiety] : anxiety [Easy Bruising] : a tendency for easy bruising [Swollen Glands In The Neck] : swollen glands in the neck [Palpitations] : palpitations [Sleep Disturbances] : sleep disturbances [As Noted in HPI] : as noted in HPI [Negative] : Endocrine [FreeTextEntry3] : Scotomas in March, resolved now [FreeTextEntry2] : Interrupted sleep [FreeTextEntry5] : anxiety r/t  [FreeTextEntry6] : Occasional anxiety-induced shortness of breath [FreeTextEntry8] : G 1 P1  [FreeTextEntry9] : Some pain and numbness and tingling in right arm- mild joint pain symmetrically but no joint stiffness/ swellling  [de-identified] : Orthostatic hypertension- not new- better with electrolyte replacement  [de-identified] : Clear anxiety symptoms [de-identified] : Fatigue symptoms [de-identified] : Swollen back cervical lymph node, mentions easy bruising

## 2024-09-02 NOTE — PHYSICAL EXAM
[General Appearance - Alert] : alert [General Appearance - In No Acute Distress] : in no acute distress [Sclera] : the sclera and conjunctiva were normal [PERRL With Normal Accommodation] : pupils were equal in size, round, and reactive to light [Extraocular Movements] : extraocular movements were intact [Outer Ear] : the ears and nose were normal in appearance [Examination Of The Oral Cavity] : the lips and gums were normal [Nasal Cavity] : the nasal mucosa and septum were normal [Oropharynx] : the oropharynx was normal [Neck Appearance] : the appearance of the neck was normal [Jugular Venous Distention Increased] : there was no jugular-venous distention [Thyroid Diffuse Enlargement] : the thyroid was not enlarged [Thyroid Nodule] : there were no palpable thyroid nodules [Respiration, Rhythm And Depth] : normal respiratory rhythm and effort [Auscultation Breath Sounds / Voice Sounds] : lungs were clear to auscultation bilaterally [Heart Rate And Rhythm] : heart rate was normal and rhythm regular [Heart Sounds] : normal S1 and S2 [Heart Sounds Gallop] : no gallops [Murmurs] : no murmurs [Heart Sounds Pericardial Friction Rub] : no pericardial rub [Full Pulse] : the pedal pulses are present [Edema] : there was no peripheral edema [Bowel Sounds] : normal bowel sounds [Abdomen Soft] : soft [Abdomen Tenderness] : non-tender [Abdomen Mass (___ Cm)] : no abdominal mass palpated [Cervical Lymph Nodes Enlarged Posterior Bilaterally] : posterior cervical [Supraclavicular Lymph Nodes Enlarged Bilaterally] : supraclavicular [Axillary Lymph Nodes Enlarged Bilaterally] : axillary [Femoral Lymph Nodes Enlarged Bilaterally] : femoral [Inguinal Lymph Nodes Enlarged Bilaterally] : inguinal [No CVA Tenderness] : no ~M costovertebral angle tenderness [No Spinal Tenderness] : no spinal tenderness [Abnormal Walk] : normal gait [Nail Clubbing] : no clubbing  or cyanosis of the fingernails [Musculoskeletal - Swelling] : no joint swelling seen [Motor Tone] : muscle strength and tone were normal [Skin Color & Pigmentation] : normal skin color and pigmentation [Skin Turgor] : normal skin turgor [] : no rash [Sensation] : the sensory exam was normal to light touch and pinprick [No Focal Deficits] : no focal deficits [Oriented To Time, Place, And Person] : oriented to person, place, and time [Affect] : the affect was normal [Impaired Insight] : insight and judgment were intact [FreeTextEntry1] : calm, pleasant, describes anxiety

## 2024-09-02 NOTE — HISTORY OF PRESENT ILLNESS
[FreeTextEntry1] : [birth year] - 43yo female with chief complaint of positive EMA and fatigue  Initial HPI with c/o fatigue and mild diffuse arthralgias w/ + EMA [id number] and mildly elevated -> repeat nl   Past muscle aches and pain, resolved Fatigue is primary current issue, symptoms are new and persistent past few months  Patient works full-time as a special-ed  Used to work out at the gym, but hasn't much since having son.. 2 ys ago..   Hx two TIAs postpartum two years ago with no residual sx and etiology unclear.. full eval at time negative for AI process.   Presented with left side numb and droopy - weakness for several minutes.  Week or two later had same symptoms again, complains on intense headache afterward Visited neurologists, attributed to complex migraine Hx chronic migraines - was on Aspirin during pregnancy to treat migraines with hx of experiencing scotomas in March  Was not tested for antiphospholipid syndrome s/p IVF, but no miscarriages Past infertility caused by unexplained reasons  No DVT, no heart attack or stroke, no phlebitis Occasional tingling in hand and elbow on right side since but nothing persis Has herniated disk (5 & 6)  No cytopenias Has Hashimoto's (normal thyroid function) and RP- mild x several ys, no medications needed (doesn't take anything for it) No ulcers that don't heal Complains of orthostatic hypertension, frequent lightheadedness but no syncope  In June, fatigue pains and tiredness occurred around limbs (at this time CPK was 277)- can't remember acute infection / stress/ injury preceding onset   Sleep: NRS  Fatigue several months now..  waking up several times a night, can be up for an hour before returning to sleep, attributes to anxiety- can't stop mind  No past anxiety diagnosis, thinks she might have it Does not wake up feeling restored No issues with snoring,  +night bruxism, occasional hand numbness at night but this is not what wakes her Occasional night sweats (not hot flashes)- but nothing routinely.  Latency minimal..   Posterior cervical lymph node enlarged, still enlarged  Biopsied at least a year ago, negative  No other swollen lymph nodes  Occasionally feels some anxiety-attributed chest tightness/shortness of breath Some numbness/tingling in right arm Bilateral hand numbness at night  Some joint pain but no inflammation  Specifically denies mucositis, visual disturbance/ keratoconjunctivitis/ oral / vaginal dryness, serositis (no cardiopulmonary, HSM), no renal dysfunction, rash, alopecia, cytopenias, bleeding.  As noted TIAs x 2 = and occas paresthesia's BUE but not associated w/ weakness

## 2024-09-02 NOTE — CONSULT LETTER
[Dear  ___] : Dear  [unfilled], [Consult Letter:] : I had the pleasure of evaluating your patient, [unfilled]. [Consult Closing:] : Thank you very much for allowing me to participate in the care of this patient.  If you have any questions, please do not hesitate to contact me. [Sincerely,] : Sincerely, [FreeTextEntry2] : Delmer Rose MD  [FreeTextEntry1] : Please see below for summary of  recent rheumatology evaluation and recommendations.  45yo w female with PMH AITD, mild orthostatic hypotension, RUE mild intermittent paresthesias, RP, and presents with chief complaint of positive EMA and fatigue worse since 6/24- but actually improved since then  1) + EMA/ AITD:  1:160 w/ DFSO pattern- not routinely associated w/ CTD.  Dx w/ Hashimotos ys ago on thyroid replacment with nl TFTs.  Longstanding hx mild RP - without complications and no medication needed  AITD Certainly could explain + EMA. Little in hx or exam to suggest CTD at this time.  NOTE:  - Hx TIA:  2022, x 2 episodes w/u negative at time for APS- no hx MI/ DVT/ PE or cytopenias  - Infertility:  etiology unclear, required aSA for successful pregnancy   2) Fatigue r/t Insomnia and probable SAVI: Describes NRS with  mild persistent with hx of anxiety by description interfering with sleep and known bruxism- not using mouth guard- needs updated device.   Wakes in middle of night and can't go back to sleep... can't stop "mind".   Denies snoring, no trouble falling asleep most nights, occas night sweats but nothing routine  - Migraines:  long standing.. with possible complex hx TIA x 2 w/ APS studies in past neg and opth manifestations- scotomas in past   - Orthostatic Hypotension:  mild intermittent. not associated with tachycardia, no syncope/  injuries- c/w volume/ electrolyte replacement..  - Anxiety:  may need pharmacotherapy.. if persists - Exercise: needs to resume and ideally 30 min outdoors daily- known to improve quality of sleep  3) Cervical radiculopathy:  Mild intermittent R hand/ elbow paresthesias- tingling / numbness..  MR + HNP C 5-6 (reportedly- would need to review)  4) Posterior cervical lymph node enlarged, still enlarged R side..  Biopsied at least a year ago, negative  No other swollen lymph nodes  Discussion:  Overall, healthy young f with little to suggest a systemic process.  There are several issues that could be addressed to manage fatigue- improve function..  - manage fatigue as needed through  1.  Managing anxiety 2.  Should address bruxism, needs to wear mouth guard, degree of pressure can be severe and result in NRS 3.  Exercise ideally and if needed may consider LD SSRI 4.  Manage stress:  consider therapy as needed if unable to manage on own... discussed use of online tools especially when waking in middle of night: Calm ruy/ Insight Timer.. can be used to promote return to sleep 5.  Mnt healthy sleep hygiene:  room dark, cool, no pets/ children in bed.  Bed is for sleep/ sex- nothing more - monitor for SS SLE/ APS but little on exam today and reassurance provided of such - monitor LN and address as needed if increase in lymphadenopathy.     Plan 8/27/24: - Manage anxiety, see therapist (probably no meds unless worsens) see above  - Try  to improve sleep, get updated oral appliance and be c/w use...though addressing anxiety may help bruxism  - given TIA  and Lymphadenopathy .. in setting of AITD hx will do full AVISE but again reassurance provided that little to suggest.    - RPA as needed, should be able to manage in Primary care, return if AI sx evolve.   [FreeTextEntry3] : Ramonita Walker DNP, [facility] Rhe[facility]ology

## 2024-09-02 NOTE — ASSESSMENT
[FreeTextEntry1] : 45yo w female with PMH AITD, mild orthostatic hypotension, RUE mild intermittent paresthesias, RP, and presents with chief complaint of positive EMA and fatigue worse since 6/24- but actually improved since then  1) + EMA/ AITD:  1:160 w/ DFSO pattern- not routinely associated w/ CTD.  Dx w/ Hashimotos ys ago on thyroid replacment with nl TFTs.  Longstanding hx mild RP - without complications and no medication needed  AITD Certainly could explain + EMA. Little in hx or exam to suggest CTD at this time.  NOTE:  - Hx TIA:  2022, x 2 episodes w/u negative at time for APS- no hx MI/ DVT/ PE or cytopenias  - Infertility:  etiology unclear, required aSA for successful pregnancy   2) Fatigue r/t Insomnia and probable SAVI: Describes NRS with  mild persistent with hx of anxiety by description interfering with sleep and known bruxism- not using mouth guard- needs updated device.   Wakes in middle of night and can't go back to sleep... can't stop "mind".   Denies snoring, no trouble falling asleep most nights, occas night sweats but nothing routine  - Migraines:  long standing.. with possible complex hx TIA x 2 w/ APS studies in past neg and opth manifestations- scotomas in past   - Orthostatic Hypotension:  mild intermittent. not associated with tachycardia, no syncope/  injuries- c/w volume/ electrolyte replacement..  - Anxiety:  may need pharmacotherapy.. if persists - Exercise: needs to resume and ideally 30 min outdoors daily- known to improve quality of sleep  3) Cervical radiculopathy:  Mild intermittent R hand/ elbow paresthesias- tingling / numbness..  MR + HNP C 5-6 (reportedly- would need to review)  4) Posterior cervical lymph node enlarged, still enlarged R side..  Biopsied at least a year ago, negative  No other swollen lymph nodes  Discussion:  Overall, healthy young f with little to suggest a systemic process.  There are several issues that could be addressed to manage fatigue- improve function..  - manage fatigue as needed through  1.  Managing anxiety 2.  Should address bruxism, needs to wear mouth guard, degree of pressure can be severe and result in NRS 3.  Exercise ideally and if needed may consider LD SSRI 4.  Manage stress:  consider therapy as needed if unable to manage on own... discussed use of online tools especially when waking in middle of night: Calm ruy/ Insight Timer.. can be used to promote return to sleep 5.  Mnt healthy sleep hygiene:  room dark, cool, no pets/ children in bed.  Bed is for sleep/ sex- nothing more - monitor for SS SLE/ APS but little on exam today and reassurance provided of such - monitor LN and address as needed if increase in lymphadenopathy.     Plan 8/27/24: - Manage anxiety, see therapist (probably no meds unless worsens) see above  - Try  to improve sleep, get updated oral appliance and be c/w use...though addressing anxiety may help bruxism  - given TIA  and Lymphadenopathy .. in setting of AITD hx will do full AVISE but again reassurance provided that little to suggest.    - RPA as needed, should be able to manage in Primary care, return if AI sx evolve.

## 2024-09-02 NOTE — REASON FOR VISIT
[Consultation] : a consultation visit [FreeTextEntry1] : + EMA with c/o fatigue and mild diffuse arthralgias

## 2025-02-18 ENCOUNTER — APPOINTMENT (OUTPATIENT)
Dept: INTERNAL MEDICINE | Facility: CLINIC | Age: 45
End: 2025-02-18
Payer: COMMERCIAL

## 2025-02-18 VITALS — WEIGHT: 145 LBS | HEART RATE: 102 BPM | HEIGHT: 65 IN | BODY MASS INDEX: 24.16 KG/M2 | OXYGEN SATURATION: 99 %

## 2025-02-18 VITALS — SYSTOLIC BLOOD PRESSURE: 118 MMHG | DIASTOLIC BLOOD PRESSURE: 76 MMHG

## 2025-02-18 DIAGNOSIS — F41.9 ANXIETY DISORDER, UNSPECIFIED: ICD-10-CM

## 2025-02-18 PROCEDURE — G2211 COMPLEX E/M VISIT ADD ON: CPT | Mod: NC

## 2025-02-18 PROCEDURE — 99213 OFFICE O/P EST LOW 20 MIN: CPT

## 2025-02-18 RX ORDER — ESCITALOPRAM OXALATE 5 MG/1
5 TABLET ORAL DAILY
Qty: 90 | Refills: 1 | Status: ACTIVE | COMMUNITY
Start: 2025-02-18 | End: 1900-01-01

## 2025-03-15 ENCOUNTER — APPOINTMENT (OUTPATIENT)
Dept: INTERNAL MEDICINE | Facility: CLINIC | Age: 45
End: 2025-03-15
Payer: COMMERCIAL

## 2025-03-15 DIAGNOSIS — F41.9 ANXIETY DISORDER, UNSPECIFIED: ICD-10-CM

## 2025-03-15 PROCEDURE — G2211 COMPLEX E/M VISIT ADD ON: CPT | Mod: NC,95

## 2025-03-15 PROCEDURE — 99213 OFFICE O/P EST LOW 20 MIN: CPT | Mod: 95

## 2025-04-02 DIAGNOSIS — E55.9 VITAMIN D DEFICIENCY, UNSPECIFIED: ICD-10-CM

## 2025-05-14 ENCOUNTER — TRANSCRIPTION ENCOUNTER (OUTPATIENT)
Age: 45
End: 2025-05-14

## 2025-05-14 ENCOUNTER — APPOINTMENT (OUTPATIENT)
Dept: INTERNAL MEDICINE | Facility: CLINIC | Age: 45
End: 2025-05-14
Payer: COMMERCIAL

## 2025-05-14 DIAGNOSIS — F41.9 ANXIETY DISORDER, UNSPECIFIED: ICD-10-CM

## 2025-05-14 PROCEDURE — G2211 COMPLEX E/M VISIT ADD ON: CPT | Mod: NC,95

## 2025-05-14 PROCEDURE — 99213 OFFICE O/P EST LOW 20 MIN: CPT | Mod: 95
